# Patient Record
Sex: MALE | Race: WHITE | NOT HISPANIC OR LATINO | Employment: FULL TIME | URBAN - METROPOLITAN AREA
[De-identification: names, ages, dates, MRNs, and addresses within clinical notes are randomized per-mention and may not be internally consistent; named-entity substitution may affect disease eponyms.]

---

## 2020-11-15 ENCOUNTER — OFFICE VISIT (OUTPATIENT)
Dept: URGENT CARE | Facility: CLINIC | Age: 55
End: 2020-11-15
Payer: COMMERCIAL

## 2020-11-15 VITALS
HEART RATE: 80 BPM | HEIGHT: 72 IN | BODY MASS INDEX: 31.15 KG/M2 | OXYGEN SATURATION: 99 % | RESPIRATION RATE: 16 BRPM | DIASTOLIC BLOOD PRESSURE: 80 MMHG | TEMPERATURE: 99 F | SYSTOLIC BLOOD PRESSURE: 130 MMHG | WEIGHT: 230 LBS

## 2020-11-15 DIAGNOSIS — L08.9 INFECTION OF INDEX FINGER: Primary | ICD-10-CM

## 2020-11-15 DIAGNOSIS — M79.645 FINGER PAIN, LEFT: ICD-10-CM

## 2020-11-15 PROCEDURE — 99203 PR OFFICE/OUTPT VISIT, NEW, LEVL III, 30-44 MIN: ICD-10-PCS | Mod: 25,S$GLB,, | Performed by: NURSE PRACTITIONER

## 2020-11-15 PROCEDURE — 73130 X-RAY EXAM OF HAND: CPT | Mod: FY,LT,S$GLB, | Performed by: RADIOLOGY

## 2020-11-15 PROCEDURE — 3008F PR BODY MASS INDEX (BMI) DOCUMENTED: ICD-10-PCS | Mod: CPTII,S$GLB,, | Performed by: NURSE PRACTITIONER

## 2020-11-15 PROCEDURE — 90471 TDAP VACCINE GREATER THAN OR EQUAL TO 7YO IM: ICD-10-PCS | Mod: S$GLB,,, | Performed by: INTERNAL MEDICINE

## 2020-11-15 PROCEDURE — 90715 TDAP VACCINE GREATER THAN OR EQUAL TO 7YO IM: ICD-10-PCS | Mod: S$GLB,,, | Performed by: INTERNAL MEDICINE

## 2020-11-15 PROCEDURE — 73130 XR HAND COMPLETE 3 VIEW LEFT: ICD-10-PCS | Mod: FY,LT,S$GLB, | Performed by: RADIOLOGY

## 2020-11-15 PROCEDURE — 90471 IMMUNIZATION ADMIN: CPT | Mod: S$GLB,,, | Performed by: INTERNAL MEDICINE

## 2020-11-15 PROCEDURE — 90715 TDAP VACCINE 7 YRS/> IM: CPT | Mod: S$GLB,,, | Performed by: INTERNAL MEDICINE

## 2020-11-15 PROCEDURE — 99203 OFFICE O/P NEW LOW 30 MIN: CPT | Mod: 25,S$GLB,, | Performed by: NURSE PRACTITIONER

## 2020-11-15 PROCEDURE — 3008F BODY MASS INDEX DOCD: CPT | Mod: CPTII,S$GLB,, | Performed by: NURSE PRACTITIONER

## 2020-11-15 RX ORDER — LISINOPRIL 10 MG/1
TABLET ORAL
COMMUNITY
Start: 2020-10-20

## 2020-11-15 RX ORDER — MUPIROCIN 20 MG/G
OINTMENT TOPICAL
Qty: 22 G | Refills: 1 | Status: SHIPPED | OUTPATIENT
Start: 2020-11-15

## 2020-11-15 RX ORDER — TICAGRELOR 90 MG/1
TABLET ORAL
COMMUNITY
Start: 2020-10-20

## 2020-11-15 RX ORDER — ROSUVASTATIN CALCIUM 40 MG/1
TABLET, COATED ORAL
COMMUNITY
Start: 2020-10-20

## 2020-11-15 RX ORDER — SULFAMETHOXAZOLE AND TRIMETHOPRIM 800; 160 MG/1; MG/1
1 TABLET ORAL 2 TIMES DAILY
Qty: 20 TABLET | Refills: 0 | Status: SHIPPED | OUTPATIENT
Start: 2020-11-15 | End: 2020-11-25

## 2020-11-15 RX ORDER — NITROGLYCERIN 0.4 MG/1
TABLET SUBLINGUAL
COMMUNITY
Start: 2020-10-20

## 2020-11-15 RX ORDER — GLIMEPIRIDE 4 MG/1
4 TABLET ORAL DAILY
COMMUNITY
Start: 2020-11-07

## 2020-11-15 RX ORDER — QUINAPRIL HYDROCHLORIDE AND HYDROCHLOROTHIAZIDE 20; 12.5 MG/1; MG/1
1 TABLET, FILM COATED ORAL DAILY
COMMUNITY
Start: 2020-10-05

## 2020-11-15 RX ORDER — METOPROLOL SUCCINATE 25 MG/1
TABLET, EXTENDED RELEASE ORAL
COMMUNITY
Start: 2020-10-20

## 2020-11-15 NOTE — PATIENT INSTRUCTIONS
"  .  You can also use Domeboro (over the counter). This helps to "pull infection out of the wound":  -Mix two packets to 1 quart of boiled or distilled water.  -Make sure bowl you mixed it in has a lid and is micro-waveable .  -Take 3 clean wash cloths and put one into solution that you mixed and place on affected area(very warm solution but NOT BOILING  HOT) for 10 minutes then discard to dirty laundry repeat with second clean wash cloth (dip into solution) and third wash cloth each for 10 minutes(total soaks on affected area are for 30 minutes).  -Then clean area with antibacterial soap and water and apply topical antibiotic to affected area and bandage with sterile/clean dressing.  -Place lid on solution and put in refrigerator then when ready to repeat process take bowl out and place in microwave until solution is steamming .  -Put warm compresses on affected area 4 to 5 times a day for the first 5 to 6 days.  _______________________________________________________________  .  Take all medications as directed. Make sure to complete your antibiotics.   .  Rest and keep yourself/patient well hydrated. For adults, it is recommended to drink at least 8-10 glasses of water daily.   .  For patients above 6 months of age who are not allergic to and are not on anticoagulants, you can alternate Tylenol and Motrin every 4-6 hours for fever above 100.4F and/or pain.  For patients less than 6 months of age, allergic to or intolerant to NSAIDS, have gastritis, gastric ulcers, or history of GI bleeds, are pregnant, or are on anticoagulant therapy, you can take Tylenol every 4 hours as needed for fever above 100.4F and/or pain.   .  If your condition worsens at any time, you should report immediately to your nearest Emergency Department for further evaluation. Symptoms include but not limited to increased redness, streaking, swelling, warmth, fever above 100.4F.    **You must understand that you have received Urgent Care " treatment only and that you may be released before all of your medical problems are known or treated. You, the patient, are responsible to arrange for follow-up care as instructed.             Understanding Infectious Tenosynovitis of the Finger, Hand, or Wrist    Tendons are tissues that connect muscles to bone. Infectious tenosynovitis is an infection of a tendon and its protective sheath. This infection is most common in the finger, hand, or wrist. It can be quite serious. Quick treatment can help prevent permanent damage to tissues.  How to say it  [id number]   What causes infectious tenosynovitis of the finger, hand, or wrist?  The most common cause is an injury that lets germs inside the tendon sheath. These injuries include animal bites, human bites, and puncture wounds. Abuse of IV drugs can also be a cause.  Symptoms of infectious tenosynovitis of the finger, hand, or wrist  Symptoms include:  · Swelling  · Pain  · Slight bending of the finger at rest  · Trouble using the hand or finger  · Fever (not always present)  Treatment for infectious tenosynovitis of the finger, hand, or wrist  Infectious tenosynovitis should be treated as soon as possible. Treatment may include:  · Surgery to drain the infection. The healthcare provider cleans the inside of the tendon sheath to wash away pus and germs. He or she may cut away damaged or dead tissue to allow remaining tissue to heal.  · Antibiotics to fight infection. These are usually given by IV (intravenous) line.  Possible complications of infectious tenosynovitis of the finger, hand, or wrist  If not treated quickly, the infection can cause tissue death. If enough tissue dies, you may need to have the affected body part removed (amputation). Untreated infection can also spread to nearby tissues or into the bloodstream.    When to call your healthcare provider  Call your healthcare provider right away if you have any of these:  · Fever of 100.4°F (38°C)  or higher, or as directed  · Redness, warmth, pain, or smelly drainage from a wound or incision  · Swelling or pain that gets worse  · Color changes in the skin  · Symptoms that dont get better, or get worse  · New symptoms   Date Last Reviewed: 6/1/2016  © 2283-2429 [employer]. [address] [place] [address]. All rights reserved. This information is not intended as a substitute for professional medical care. Always follow your healthcare professional's instructions.        Discharge Instructions for Cellulitis  You have been diagnosed with cellulitis. This is an infection in the deepest layer of the skin. In some cases, the infection also affects the muscle. Cellulitis is caused by bacteria. The bacteria can enter the body through broken skin. This can happen with a cut, scratch, animal bite, or an insect bite that has been scratched. You may have been treated in the hospital with antibiotics and fluids. You will likely be given a prescription for antibiotics to take at home. This sheet will help you take care of yourself at home.  Home care  When you are home:  · Take the prescribed antibiotic medicine you are given as directed until it is gone. Take it even if you feel better. It treats the infection and stops it from returning. Not taking all the medicine can make future infections hard to treat.  · Keep the infected area clean.  · When possible, raise the infected area above the level of your heart. This helps keep swelling down.  · Talk with your healthcare provider if you are in pain. Ask what kind of over-the-counter medicine you can take for pain.  · Apply clean bandages as advised.  · Take your temperature once a day for a week.  · Wash your hands often to prevent spreading the infection.  In the future, wash your hands before and after you touch cuts, scratches, or bandages. This will help prevent infection.   When to call your healthcare provider  Call your healthcare provider  immediately if you have any of the following:  · Difficulty or pain when moving the joints above or below the infected area  · Discharge or pus draining from the area  · Fever of 100.4°F (38°C) or higher, or as directed by your healthcare provider  · Pain that gets worse in or around the infected   · Redness that gets worse in or around the infected area, particularly if the area of redness expands to a wider area  · Shaking chills  · Swelling of the infected area  · Vomiting   Date Last Reviewed: 8/1/2016 © 2000-2017 [employer]. [address] [place] [address]. All rights reserved. This information is not intended as a substitute for professional medical care. Always follow your healthcare professional's instructions.

## 2020-11-15 NOTE — PROGRESS NOTES
Subjective:       Patient ID: Marshall Reis is a 54 y.o. male.    Vitals:  height is 6' (1.829 m) and weight is 104.3 kg (230 lb). His tympanic temperature is 98.5 °F (36.9 °C). His blood pressure is 130/80 and his pulse is 80. His respiration is 16 and oxygen saturation is 99%.     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  The finger has been swollen and painful.  Patient does not remember when his last tetanus shot was.      Finger Pain  This is a new problem. The current episode started in the past 7 days. The problem occurs constantly. The problem has been gradually worsening. Associated symptoms include arthralgias and joint swelling. Pertinent negatives include no chest pain, chills, congestion, coughing, fatigue, fever, headaches, myalgias, nausea, rash, sore throat, vertigo or vomiting. Exacerbated by: movement. He has tried nothing for the symptoms. The treatment provided no relief.       Constitution: Negative for chills, fatigue and fever.   HENT: Negative for congestion and sore throat.    Neck: Negative for painful lymph nodes.   Cardiovascular: Negative for chest pain and leg swelling.   Eyes: Negative for double vision and blurred vision.   Respiratory: Negative for cough and shortness of breath.    Gastrointestinal: Negative for nausea, vomiting and diarrhea.   Genitourinary: Negative for dysuria, frequency and urgency.   Musculoskeletal: Positive for pain, joint pain and joint swelling. Negative for muscle cramps and muscle ache.   Skin: Negative for color change, pale and rash.   Allergic/Immunologic: Negative for seasonal allergies.   Neurological: Negative for dizziness, history of vertigo, light-headedness, passing out and headaches.   Hematologic/Lymphatic: Negative for swollen lymph nodes, easy bruising/bleeding and history of blood clots. Does not bruise/bleed easily.    Psychiatric/Behavioral: Negative for nervous/anxious, sleep disturbance and depression. The patient is not nervous/anxious.        Objective:      Physical Exam   Constitutional: He is oriented to person, place, and time.  Non-toxic appearance. No distress.   HENT:   Head: Atraumatic.   Ears:   Right Ear: External ear normal.   Left Ear: External ear normal.   Mouth/Throat: Mucous membranes are moist.   Eyes: Conjunctivae are normal. No scleral icterus.   Neck: Neck supple.   Cardiovascular: Normal rate.   Pulmonary/Chest: Effort normal. No respiratory distress.   Musculoskeletal:      Left hand: He exhibits decreased range of motion, tenderness and swelling (and erythema). He exhibits no bony tenderness and normal capillary refill. Normal sensation noted. Normal strength noted.        Hands:    Neurological: He is alert and oriented to person, place, and time.   Skin: Skin is warm, dry and not diaphoretic. Psychiatric: His behavior is normal. Mood, judgment and thought content normal.   Nursing note and vitals reviewed.        Assessment:       1. Infection of index finger    2. Finger pain, left        Plan:       Xr Hand Complete 3 View Left    Result Date: 11/15/2020  EXAMINATION: XR HAND COMPLETE 3 VIEW LEFT CLINICAL HISTORY: . Pain in left hand TECHNIQUE: PA, lateral, and oblique views of the left hand were performed. COMPARISON: None FINDINGS: Three views left hand. No acute displaced fracture or dislocation of the hand.  No radiopaque foreign body.  There is edema about the 2nd digit and thenar region.     1. No acute displaced fracture or dislocation of the hand noting nonspecific edema about 2nd digit and thenar region, no radiopaque foreign body. Electronically signed by: Jose Rousseau MD Date:    11/15/2020 Time:    14:16      Infection of index finger  -     mupirocin (BACTROBAN) 2 % ointment; Apply to affected area 3 times daily  Dispense: 22 g; Refill: 1  -     sulfamethoxazole-trimethoprim  "800-160mg (BACTRIM DS) 800-160 mg Tab; Take 1 tablet by mouth 2 (two) times daily. for 10 days  Dispense: 20 tablet; Refill: 0  -     Ambulatory referral/consult to Orthopedics  -     (In Office Administered) Tdap Vaccine    Finger pain, left  -     XR HAND COMPLETE 3 VIEW LEFT; Future; Expected date: 11/15/2020  -     Ambulatory referral/consult to Orthopedics      Patient Instructions       .  You can also use Domeboro (over the counter). This helps to "pull infection out of the wound":  -Mix two packets to 1 quart of boiled or distilled water.  -Make sure bowl you mixed it in has a lid and is micro-waveable .  -Take 3 clean wash cloths and put one into solution that you mixed and place on affected area(very warm solution but NOT BOILING  HOT) for 10 minutes then discard to dirty laundry repeat with second clean wash cloth (dip into solution) and third wash cloth each for 10 minutes(total soaks on affected area are for 30 minutes).  -Then clean area with antibacterial soap and water and apply topical antibiotic to affected area and bandage with sterile/clean dressing.  -Place lid on solution and put in refrigerator then when ready to repeat process take bowl out and place in microwave until solution is steamming .  -Put warm compresses on affected area 4 to 5 times a day for the first 5 to 6 days.  _______________________________________________________________  .  Take all medications as directed. Make sure to complete your antibiotics.   .  Rest and keep yourself/patient well hydrated. For adults, it is recommended to drink at least 8-10 glasses of water daily.   .  For patients above 6 months of age who are not allergic to and are not on anticoagulants, you can alternate Tylenol and Motrin every 4-6 hours for fever above 100.4F and/or pain.  For patients less than 6 months of age, allergic to or intolerant to NSAIDS, have gastritis, gastric ulcers, or history of GI bleeds, are pregnant, or are on anticoagulant " therapy, you can take Tylenol every 4 hours as needed for fever above 100.4F and/or pain.   .  If your condition worsens at any time, you should report immediately to your nearest Emergency Department for further evaluation. Symptoms include but not limited to increased redness, streaking, swelling, warmth, fever above 100.4F.    **You must understand that you have received Urgent Care treatment only and that you may be released before all of your medical problems are known or treated. You, the patient, are responsible to arrange for follow-up care as instructed.             Understanding Infectious Tenosynovitis of the Finger, Hand, or Wrist    Tendons are tissues that connect muscles to bone. Infectious tenosynovitis is an infection of a tendon and its protective sheath. This infection is most common in the finger, hand, or wrist. It can be quite serious. Quick treatment can help prevent permanent damage to tissues.  How to say it  [id number]-[id number]-[id number]   What causes infectious tenosynovitis of the finger, hand, or wrist?  The most common cause is an injury that lets germs inside the tendon sheath. These injuries include animal bites, human bites, and puncture wounds. Abuse of IV drugs can also be a cause.  Symptoms of infectious tenosynovitis of the finger, hand, or wrist  Symptoms include:  · Swelling  · Pain  · Slight bending of the finger at rest  · Trouble using the hand or finger  · Fever (not always present)  Treatment for infectious tenosynovitis of the finger, hand, or wrist  Infectious tenosynovitis should be treated as soon as possible. Treatment may include:  · Surgery to drain the infection. The healthcare provider cleans the inside of the tendon sheath to wash away pus and germs. He or she may cut away damaged or dead tissue to allow remaining tissue to heal.  · Antibiotics to fight infection. These are usually given by IV (intravenous) line.  Possible complications of infectious tenosynovitis of the  finger, hand, or wrist  If not treated quickly, the infection can cause tissue death. If enough tissue dies, you may need to have the affected body part removed (amputation). Untreated infection can also spread to nearby tissues or into the bloodstream.    When to call your healthcare provider  Call your healthcare provider right away if you have any of these:  · Fever of 100.4°F (38°C) or higher, or as directed  · Redness, warmth, pain, or smelly drainage from a wound or incision  · Swelling or pain that gets worse  · Color changes in the skin  · Symptoms that dont get better, or get worse  · New symptoms   Date Last Reviewed: 6/1/2016 © 2000-2017 [employer]. [address] [place] [address], [place] [address]. All rights reserved. This information is not intended as a substitute for professional medical care. Always follow your healthcare professional's instructions.        Discharge Instructions for Cellulitis  You have been diagnosed with cellulitis. This is an infection in the deepest layer of the skin. In some cases, the infection also affects the muscle. Cellulitis is caused by bacteria. The bacteria can enter the body through broken skin. This can happen with a cut, scratch, animal bite, or an insect bite that has been scratched. You may have been treated in the hospital with antibiotics and fluids. You will likely be given a prescription for antibiotics to take at home. This sheet will help you take care of yourself at home.  Home care  When you are home:  · Take the prescribed antibiotic medicine you are given as directed until it is gone. Take it even if you feel better. It treats the infection and stops it from returning. Not taking all the medicine can make future infections hard to treat.  · Keep the infected area clean.  · When possible, raise the infected area above the level of your heart. This helps keep swelling down.  · Talk with your healthcare provider if you are in pain. Ask what kind  of over-the-counter medicine you can take for pain.  · Apply clean bandages as advised.  · Take your temperature once a day for a week.  · Wash your hands often to prevent spreading the infection.  In the future, wash your hands before and after you touch cuts, scratches, or bandages. This will help prevent infection.   When to call your healthcare provider  Call your healthcare provider immediately if you have any of the following:  · Difficulty or pain when moving the joints above or below the infected area  · Discharge or pus draining from the area  · Fever of 100.4°F (38°C) or higher, or as directed by your healthcare provider  · Pain that gets worse in or around the infected   · Redness that gets worse in or around the infected area, particularly if the area of redness expands to a wider area  · Shaking chills  · Swelling of the infected area  · Vomiting   Date Last Reviewed: 8/1/2016  © 7729-5979 [employer]. [address] [place] [address], [place] [address]. All rights reserved. This information is not intended as a substitute for professional medical care. Always follow your healthcare professional's instructions.